# Patient Record
(demographics unavailable — no encounter records)

---

## 2024-12-02 NOTE — PHYSICAL EXAM
[General Appearance - In No Acute Distress] : no acute distress [Normal Conjunctiva] : the conjunctiva exhibited no abnormalities [Heart Rate And Rhythm] : heart rate and rhythm were normal [Heart Sounds] : normal S1 and S2 [Murmurs] : no murmurs present [Arterial Pulses Normal] : the arterial pulses were normal [Edema] : no peripheral edema present [Respiration, Rhythm And Depth] : normal respiratory rhythm and effort [Auscultation Breath Sounds / Voice Sounds] : lungs were clear to auscultation bilaterally [Bowel Sounds] : normal bowel sounds [Abdomen Soft] : soft [Abdomen Tenderness] : non-tender [Abnormal Walk] : normal gait [] : no rash [Oriented To Time, Place, And Person] : oriented to person, place, and time [Impaired Insight] : insight and judgment were intact [Affect] : the affect was normal

## 2024-12-02 NOTE — HISTORY OF PRESENT ILLNESS
[FreeTextEntry1] : 47 yo woman s.p bariatric surgery (1996) hx of complicated Headache/Migraine and HTN referred for evaluation of syncope.  Patient reports that she has been having episodes of syncope since 2 yrs (approx 5 episodes/month). She does not know how long she is out for but She knows that she is going to faint because she feels her head pounding and her ear clogged. She was told that before she faints, she stares for a little while and becomes unresponsive after which she collapses to the floor.  ====Reviewed Today==== vitals within normal limits TTE 2023: Normal EF, septal hypertrophy 1.6cm (HCM?) ECG: sinus , anterior Q waves (old) [facility] (2023): normal

## 2024-12-02 NOTE — REASON FOR VISIT
[Initial Evaluation] : an initial evaluation of [Abnormal ECG] : an abnormal ECG [Hypertension] : hypertension [Syncope] : syncope

## 2024-12-02 NOTE — ASSESSMENT
[FreeTextEntry1] : 47 yo woman s.p bariatric surgery (1996) hx of complicated Headache/Migraine and HTN referred for evaluation of syncope.  #Syncope - Less likely to be cardiac  - Prior TTE reviewed showed septal Hypertrophy - Prior LHC was normal  - repeat TTE  - carotid US - MCOT x 1months - refer to Dr. Archuleta for further evaluation of suspected HCM - Smoking cessation advised    I was physically present with the Resident/Fellow at the time of the visit. I agree with the findings, assessment and plan as written, unless noted below.

## 2024-12-02 NOTE — ASSESSMENT
[FreeTextEntry1] : 49 yo woman s.p bariatric surgery (1996) hx of complicated Headache/Migraine and HTN referred for evaluation of syncope.  #Syncope - Less likely to be cardiac  - Prior TTE reviewed showed septal Hypertrophy - Prior LHC was normal  - repeat TTE  - carotid US - [facility]OT x 1months - refer to Dr. Archuleta for further evaluation of suspected HCM - Smoking cessation advised    I was physically present with the Resident/Fellow at the time of the visit. I agree with the findings, assessment and plan as written, unless noted below.

## 2024-12-02 NOTE — HISTORY OF PRESENT ILLNESS
[FreeTextEntry1] : 49 yo woman s.p bariatric surgery (1996) hx of complicated Headache/Migraine and HTN referred for evaluation of syncope.  Patient reports that she has been having episodes of syncope since 2 yrs (approx 5 episodes/month). She does not know how long she is out for but She knows that she is going to faint because she feels her head pounding and her ear clogged. She was told that before she faints, she stares for a little while and becomes unresponsive after which she collapses to the floor.  ====Reviewed Today==== vitals within normal limits TTE 2023: Normal EF, septal hypertrophy 1.6cm (HCM?) ECG: sinus , anterior Q waves (old) [facility] (2023): normal
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
## 2024-12-16 NOTE — CARDIOLOGY SUMMARY
[de-identified] : 12/16/2024 NSR (HR 60 bpm), ant q waves [de-identified] : TTE 2023: Normal EF, septal hypertrophy 1.6cm (HCM?) [de-identified] : [facility] (2023): normal

## 2024-12-16 NOTE — ASSESSMENT
[FreeTextEntry1] : # HCM, Syncope - Syncopal episodes do not sound cardiac in nature but given septal hypertrophy and c/f HCM, rec following up on MCOT to assess for cardiac arrhythmias. No urgent reports. - Pt planned for echo, carotid US - Cardiac MRI ordered today by Dr. Archuleta.  I have also advised the patient to go to the nearest emergency room if she experiences any chest pain, dyspnea, syncope, or has any other compelling symptoms.      Follow up after above testing
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
## 2024-12-16 NOTE — PHYSICAL EXAM
[No Acute Distress] : no acute distress [Normal S1, S2] : normal S1, S2 [Clear Lung Fields] : clear lung fields [Good Air Entry] : good air entry [No Respiratory Distress] : no respiratory distress  [Soft] : abdomen soft [Moves all extremities] : moves all extremities [No Focal Deficits] : no focal deficits [Normal Speech] : normal speech [Alert and Oriented] : alert and oriented [Normal memory] : normal memory [Frail] : frail [Ill-Appearing] : ill-appearing [de-identified] : poor dentition

## 2024-12-16 NOTE — CARDIOLOGY SUMMARY
[de-identified] : 12/16/2024 NSR (HR 60 bpm), ant q waves [de-identified] : TTE 2023: Normal EF, septal hypertrophy 1.6cm (HCM?) [de-identified] : [facility] (2023): normal

## 2024-12-16 NOTE — PHYSICAL EXAM
[No Acute Distress] : no acute distress [Normal S1, S2] : normal S1, S2 [Clear Lung Fields] : clear lung fields [Good Air Entry] : good air entry [No Respiratory Distress] : no respiratory distress  [Soft] : abdomen soft [Moves all extremities] : moves all extremities [No Focal Deficits] : no focal deficits [Normal Speech] : normal speech [Alert and Oriented] : alert and oriented [Normal memory] : normal memory [Frail] : frail [Ill-Appearing] : ill-appearing [de-identified] : poor dentition

## 2024-12-19 NOTE — ASSESSMENT
[FreeTextEntry1] : Ms. GUILLAUME PLUNKETT is a 46 year old woman with PMHx of possible HCM (basal septal hypertrophy 13-16mm) who is presenting to establish care in HCM clinic  -Had lengthy discussion with patient about HCM, including the diagnosis, management, and prognosis -TTE demonstrates 13mm septum, without LVOTo -Order CMR for more definitive measurements -Defer treatment given lack of symptoms and LVOT obstruction for now -Refer to EP Dr Wayne given history of syncope, although her syncopal episodes do not sound cardiac -Recommended to increase PO intake and avoid dehydration -Discussed genetic testing. Patient will think about it.  Time in encounter, including face to face visit and time reviewing chart: 45  minutes  Bandar Archuleta MD, FACC Non-Invasive Cardiology [address], [address] Office: [phone]

## 2024-12-19 NOTE — REASON FOR VISIT
[FreeTextEntry1] : Ms. GUILLAUME PLUNKETT is a 46 year old woman with PMHx of possible HCM (basal septal hypertrophy 13-16mm) who is presenting to establish care in HCM clinic. She had episodes of recurrent syncope. She describes a palpitations, tunnel vision, "head pounding" and associated chest pain. These symptoms are not associated with exertion. Outside of these episodes she feels well. She has no chest pain, SOB or dyspnea.  She has no FHx of HCM or SCD.  TTE 12/16/24 CONCLUSIONS: 1. Left ventricular cavity is normal in size. Left ventricular systolic function is normal with an ejection fraction of 57 % by Orr's method of disks. There are no regional wall motion abnormalities seen. 2. Basal septal hypertrophy (measures 1.3 cm) without evidence of left ventricular outflow tract obstruction. 3. There is a late peaking mid-cavitary gradient of 27 mmHg with Valsalva. 4. Normal left ventricular diastolic function, with normal left ventricular filling pressure. 5. Normal right ventricular cavity size, with normal wall thickness, and normal right ventricular systolic function. 6. Mild aortic regurgitation. 7. Mild mitral regurgitation. 8. Mild tricuspid regurgitation. 9. No echocardiographic evidence of pulmonary hypertension.  TTE 9/8/23 Summary:  1. Normal global left ventricular systolic function.  2. LV Ejection Fraction by Orr's Method with a biplane EF of 73 %.  3. Hypertrophic cardiomyopathy with max LV wall thickness 16mm at the basal septum.  4. No evidence of systolic anterior motion (MICKI) of MV. No LVOT obstruction.  5. No intracavitary LV gradient visualized on this study.  6. Normal right ventricular size and function.  7. Mild mitral valve regurgitation.  8. Mild tricuspid regurgitation.  9. Borderline pulmonary hypertension (PASP= 34 mmHg).

## 2024-12-19 NOTE — ASSESSMENT
[FreeTextEntry1] : Ms. GUILLAUME PLUNKETT is a 46 year old woman with PMHx of possible HCM (basal septal hypertrophy 13-16mm) who is presenting to establish care in HCM clinic  -Had lengthy discussion with patient about HCM, including the diagnosis, management, and prognosis -TTE demonstrates 13mm septum, without LVOTo -Order CMR for more definitive measurements -Defer treatment given lack of symptoms and LVOT obstruction for now -Refer to EP Dr Wayne given history of syncope, although her syncopal episodes do not sound cardiac -Recommended to increase PO intake and avoid dehydration -Discussed genetic testing. Patient will think about it.  Time in encounter, including face to face visit and time reviewing chart: 45  minutes  Bandar Arcuhleta MD, FACC Non-Invasive Cardiology [address], [address] Office: [phone]

## 2024-12-19 NOTE — ASSESSMENT
[FreeTextEntry1] : Ms. GUILLAUME PLUNKETT is a 46 year old woman with PMHx of possible HCM (basal septal hypertrophy 13-16mm) who is presenting to establish care in HCM clinic  -Had lengthy discussion with patient about HCM, including the diagnosis, management, and prognosis -TTE demonstrates 13mm septum, without LVOTo -Order CMR for more definitive measurements -Defer treatment given lack of symptoms and LVOT obstruction for now -Refer to EP Dr Wayne given history of syncope, although her syncopal episodes do not sound cardiac -Recommended to increase PO intake and avoid dehydration -Discussed genetic testing. Patient will think about it.  Time in encounter, including face to face visit and time reviewing chart: 45  minutes  Bandar Archuleta MD, FACC Non-Invasive Cardiology [address], [address] Office: [phone]

## 2025-01-23 NOTE — PHYSICAL EXAM
[No Acute Distress] : no acute distress [Normal Appearance] : normal appearance [No Neck Mass] : no neck mass [Normal Rate/Rhythm] : normal rate/rhythm [Normal S1, S2] : normal s1, s2 [No Murmurs] : no murmurs [No Resp Distress] : no resp distress [Clear to Auscultation Bilaterally] : clear to auscultation bilaterally [No Abnormalities] : no abnormalities [Benign] : benign [Normal Gait] : normal gait [No Clubbing] : no clubbing [No Cyanosis] : no cyanosis [No Edema] : no edema [FROM] : FROM [Normal Color/ Pigmentation] : normal color/ pigmentation [No Focal Deficits] : no focal deficits [Oriented x3] : oriented x3 [Normal Affect] : normal affect

## 2025-01-23 NOTE — HISTORY OF PRESENT ILLNESS
[TextBox_4] : - Chief Complaint (CC) : Unexplained episodes of injuries and suspected seizures for approximately two years. - History of Present Illness : The patient, a middle-aged individual, reports experiencing 'episodes' for about two years. These episodes are characterized by the patient waking up with unexplained cuts, scrapes, or bruises on their knees or hands. The patient describes these as 'flashbacks' or 'blackouts' where they cannot remember how the injuries occurred. The patient's aunt witnessed one of these episodes, describing it as a 'blackout' lasting about 30 minutes, during which the patient appeared to act 'like a robot' that was shut off just before passing out. After these episodes, the patient reports feeling drained and weak. The patient has consulted a neurologist who suggested these episodes might be seizures. The patient also has a history of sleep apnea, diagnosed before a gastric bypass surgery in 1996. They used a CPAP machine at that time, but it was not readjusted after significant weight loss from the surgery. The patient reports current symptoms of feeling tired, run down, sleepy, and snoring at night. They also mention frequent waking and 'flip-flopping' during sleep. - Past Medical History : 1. Gastric bypass surgery in 1996 2. Sleep apnea (diagnosed before 1996)  3. Suspected seizures (onset approximately 2 years ago)
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
